# Patient Record
Sex: MALE | Race: WHITE | URBAN - METROPOLITAN AREA
[De-identification: names, ages, dates, MRNs, and addresses within clinical notes are randomized per-mention and may not be internally consistent; named-entity substitution may affect disease eponyms.]

---

## 2017-01-16 ENCOUNTER — HISTORICAL (OUTPATIENT)
Dept: ADMINISTRATIVE | Facility: HOSPITAL | Age: 72
End: 2017-01-16

## 2017-01-26 ENCOUNTER — HISTORICAL (OUTPATIENT)
Dept: RADIOLOGY | Facility: HOSPITAL | Age: 72
End: 2017-01-26

## 2017-02-09 ENCOUNTER — HISTORICAL (OUTPATIENT)
Dept: ADMINISTRATIVE | Facility: HOSPITAL | Age: 72
End: 2017-02-09

## 2017-05-03 ENCOUNTER — HISTORICAL (OUTPATIENT)
Dept: RADIOLOGY | Facility: HOSPITAL | Age: 72
End: 2017-05-03

## 2017-06-15 ENCOUNTER — HISTORICAL (OUTPATIENT)
Dept: LAB | Facility: HOSPITAL | Age: 72
End: 2017-06-15

## 2018-03-29 ENCOUNTER — HISTORICAL (OUTPATIENT)
Dept: ADMINISTRATIVE | Facility: HOSPITAL | Age: 73
End: 2018-03-29

## 2020-01-22 ENCOUNTER — HISTORICAL (OUTPATIENT)
Dept: RESPIRATORY THERAPY | Facility: HOSPITAL | Age: 75
End: 2020-01-22

## 2020-08-11 ENCOUNTER — HISTORICAL (OUTPATIENT)
Dept: RADIOLOGY | Facility: HOSPITAL | Age: 75
End: 2020-08-11

## 2022-04-07 ENCOUNTER — HISTORICAL (OUTPATIENT)
Dept: ADMINISTRATIVE | Facility: HOSPITAL | Age: 77
End: 2022-04-07

## 2022-04-24 VITALS
DIASTOLIC BLOOD PRESSURE: 74 MMHG | BODY MASS INDEX: 24.64 KG/M2 | OXYGEN SATURATION: 95 % | WEIGHT: 157 LBS | SYSTOLIC BLOOD PRESSURE: 115 MMHG | HEIGHT: 67 IN

## 2022-04-28 NOTE — OP NOTE
DATE OF SURGERY:        SURGEON:  MICHELLE Carolina MD    PROCEDURE:  Thoracentesis under ultrasound guidance.    PREOPERATIVE DIAGNOSIS:  Right pleural effusion.    POSTOPERATIVE DIAGNOSIS:  Bloody right pleural effusion.    PROCEDURE IN DETAIL:  After informed consent was obtained, the patient was prepped in the usual fashion.  Ultrasound guidance was used to locate the largest area of pleural fluid in the right base.  There were some loculations present so we went fairly inferiorly.  The skin was cleansed and infiltrated with approximately 4 cc of 1% lidocaine without.  A small stab incision was made, and then the [employer] thoracentesis catheter was inserted into the pleural space where 1000 cc of nonclotting, bloody fluid was withdrawn.  Postprocedure chest x-ray revealed no evidence of pneumothorax.  The patient tolerated the procedure well and had no bleeding.  Of note, he had stopped his Pradaxa 3 days prior to this procedure.        ______________________________  G. Jose Carolina MD    GGG/UA  DD:  08/11/2020  Time:  09:29AM  DT:  08/11/2020  Time:  09:41AM  Job #:  [id number]